# Patient Record
Sex: FEMALE | Race: WHITE | Employment: FULL TIME | URBAN - METROPOLITAN AREA
[De-identification: names, ages, dates, MRNs, and addresses within clinical notes are randomized per-mention and may not be internally consistent; named-entity substitution may affect disease eponyms.]

---

## 2020-03-20 ENCOUNTER — OFFICE VISIT (OUTPATIENT)
Dept: FAMILY MEDICINE | Facility: CLINIC | Age: 40
End: 2020-03-20
Payer: OTHER MISCELLANEOUS

## 2020-03-20 VITALS
DIASTOLIC BLOOD PRESSURE: 87 MMHG | SYSTOLIC BLOOD PRESSURE: 131 MMHG | WEIGHT: 237 LBS | TEMPERATURE: 97 F | HEART RATE: 85 BPM | OXYGEN SATURATION: 99 % | BODY MASS INDEX: 38.25 KG/M2 | RESPIRATION RATE: 18 BRPM

## 2020-03-20 DIAGNOSIS — G56.20 ULNAR NERVE ENTRAPMENT, UNSPECIFIED LATERALITY: ICD-10-CM

## 2020-03-20 DIAGNOSIS — G56.03 BILATERAL CARPAL TUNNEL SYNDROME: Primary | ICD-10-CM

## 2020-03-20 PROCEDURE — 99203 OFFICE O/P NEW LOW 30 MIN: CPT | Performed by: PHYSICIAN ASSISTANT

## 2020-03-20 RX ORDER — FLUTICASONE PROPIONATE 110 UG/1
2 AEROSOL, METERED RESPIRATORY (INHALATION) 2 TIMES DAILY
COMMUNITY
Start: 2019-03-29

## 2020-03-20 RX ORDER — FLUTICASONE PROPIONATE 50 MCG
2 SPRAY, SUSPENSION (ML) NASAL
COMMUNITY
Start: 2018-04-11

## 2020-03-20 RX ORDER — PSEUDOEPHEDRINE HCL 30 MG
TABLET ORAL EVERY 4 HOURS PRN
COMMUNITY

## 2020-03-20 RX ORDER — DICLOFENAC SODIUM 75 MG/1
75 TABLET, DELAYED RELEASE ORAL 2 TIMES DAILY
Qty: 30 TABLET | Refills: 1 | Status: SHIPPED | OUTPATIENT
Start: 2020-03-20 | End: 2020-03-27

## 2020-03-20 RX ORDER — ALBUTEROL SULFATE 90 UG/1
AEROSOL, METERED RESPIRATORY (INHALATION)
COMMUNITY
Start: 2019-03-29

## 2020-03-20 RX ORDER — CETIRIZINE HYDROCHLORIDE 10 MG/1
10 TABLET ORAL DAILY
COMMUNITY

## 2020-03-20 NOTE — PATIENT INSTRUCTIONS
For the next 2 weeks wear your wrist braces 24/7 when able.   For the following 2 weeks wear the wrist braces at night time.

## 2020-03-20 NOTE — PROGRESS NOTES
Subjective     Gaviota Haque is a 39 year old female who presents to clinic today for the following health issues:    HPI   Musculoskeletal problem/pain      Duration: x20 days    Description  Location: both arms from elbows to finger tips    Intensity:  moderate    Accompanying signs and symptoms: numbness and tingling    History  Previous similar problem: no   Previous evaluation:  none    Precipitating or alleviating factors:  Trauma or overuse: YES- trauma and overuse  Aggravating factors include: during sleep and bending of wrist     Therapies tried and outcome: rest/inactivity and support wrap    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    Past Surgical History:   Procedure Laterality Date     deviated septum       GALLBLADDER SURGERY  10/2011       Social History     Tobacco Use     Smoking status: Never Smoker     Smokeless tobacco: Never Used     Tobacco comment: Lives in smoke free household   Substance Use Topics     Alcohol use: Yes     Comment: occ     Family History   Problem Relation Age of Onset     Cancer No family hx of      Diabetes No family hx of      Glaucoma No family hx of      Macular Degeneration No family hx of      Hypertension Father      Thyroid Disease Mother      Cerebrovascular Disease Paternal Grandfather            Reviewed and updated as needed this visit by Provider         Review of Systems   ROS COMP: Constitutional, musculoskeletal, neuro, skin systems are negative, except as otherwise noted.      Objective    There were no vitals taken for this visit.  There is no height or weight on file  to calculate BMI.  Physical Exam   GENERAL: healthy, alert and no distress  MS: no gross musculoskeletal defects noted, no edema  MS: RUE and LUE exam shows normal strength and muscle mass, no deformities, no evidence of joint effusion and ROM of all joints is normal. Negative Tinel's at wrists, positive Tinel's at right elbow, positive Phalen's bilat  SKIN: no suspicious lesions or rashes  NEURO: Normal strength and tone, mentation intact and speech normal  PSYCH: mentation appears normal, affect normal/bright        Assessment & Plan   Assessment  1. Bilateral carpal tunnel syndrome    2. Ulnar nerve entrapment, unspecified laterality         Plan  1,2) Diclofenac BID x7 days. Continue wearing her wrist braces. 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       Patient Instructions   For the next 2 weeks wear your wrist braces 24/7 when able.   For the following 2 weeks wear the wrist braces at night time.       Return in about 2 months (around 5/20/2020) for wrist/elbow follow up.    Ann Rodriguez PA-C  [facility]

## 2020-03-20 NOTE — LETTER
[facility]  [address] [facility] [address]  Phone: [phone]    March 20, 2020        Gaviota Haque  [address] [facility] [address]          To whom it may concern:    RE: Gaviota Haque    Patient was seen and treated today at our clinic. Due to the symptoms we discussed I recommend she abstain from compounding for the next 2 months. We plan to reassess in 2 months (end of May).     Please contact me for questions or concerns.      Sincerely,        Ann Rodriguez PA-C

## 2020-04-03 ENCOUNTER — OFFICE VISIT (OUTPATIENT)
Dept: URGENT CARE | Facility: URGENT CARE | Age: 40
End: 2020-04-03

## 2020-04-03 ENCOUNTER — RESULTS ONLY (OUTPATIENT)
Dept: LAB | Age: 40
End: 2020-04-03

## 2020-04-03 DIAGNOSIS — Z20.822 SUSPECTED COVID-19 VIRUS INFECTION: Primary | ICD-10-CM

## 2020-04-03 PROCEDURE — 99207 ZZC NO BILLABLE SERVICE THIS VISIT: CPT | Performed by: NURSE PRACTITIONER

## 2020-04-03 NOTE — PATIENT INSTRUCTIONS
[facility] Employee Health team will receive your Covid 19 test results in the next 1-4 days.  Once your results are received, Employee Health will call you with your test result and discuss your Return to Work timeline and criteria.

## 2020-04-04 LAB
SARS-COV-2 PCR COMMENT: NORMAL
SARS-COV-2 RNA SPEC QL NAA+PROBE: NORMAL
SARS-COV-2 RNA SPEC QL NAA+PROBE: NORMAL
SPECIMEN SOURCE: NORMAL
SPECIMEN SOURCE: NORMAL

## 2020-04-09 ENCOUNTER — NURSE TRIAGE (OUTPATIENT)
Dept: NURSING | Facility: CLINIC | Age: 40
End: 2020-04-09

## 2020-04-09 NOTE — TELEPHONE ENCOUNTER
Patient calling for results, gave per epic. Patient still experiencing , SOB, sore throat, fatigue and dry cough. Advised to still stay home and self quarantine for 14 days. If your sx worsen  Advised to call back as needed.  Sharda Soler RN [facility]  COVID 19 Nurse Triage Plan/Patient Instructions    Please be aware that novel coronavirus (COVID-19) may be circulating in the community. If you develop symptoms such as fever, cough, or SOB or if you have concerns about the presence of another infection including coronavirus (COVID-19), please contact your health care provider or visit [email or web address].     Disposition/Instructions    Patient to stay at home and follow home care protocol based instructions.     Thank you for limiting contact with others, wearing a simple mask to cover your cough, practice good hand hygiene habits and accessing our virtual services where possible to limit the spread of this virus.    For more information about COVID19 and options for caring for yourself at home, please visit the CDC website at https://www.cdc.gov/coronavirus/2019-ncov/about/steps-when-sick.html  For more options for care at [facility], please visit our website at [email or web address]    For more information, please use the [place] Department of Health ([facility]) COVID-19 Hotlines (Interpreters available):     Health questions: Phone Number: [phone] or [phone] and Hours: 7 a.m. to 7 p.m.    Schools and  questions: Phone Number: [phone] or [phone] and Hours 7 a.m. to 7 p.m.

## 2020-04-10 ENCOUNTER — VIRTUAL VISIT (OUTPATIENT)
Dept: FAMILY MEDICINE | Facility: OTHER | Age: 40
End: 2020-04-10

## 2020-04-10 NOTE — PROGRESS NOTES
"Date: 04/10/2020 11:10:12  Clinician: Tito Vieira  Clinician NPI: [id number]  Patient: Gaviota Haque  Patient : 1980  Patient Address: [address] [place] [address]  Patient Phone: [phone]  Visit Protocol: URI  Patient Summary:  Gaviota is a 39 year old ( : 1980 ) female who initiated a Visit for cold, sinus infection, or influenza. When asked the question \"Please sign me up to receive news, health information and promotions from [employer].\", Gaviota responded \"No\".    Gaviota states her symptoms started suddenly 7-9 days ago. After her symptoms started, they improved and then got worse again.   Her symptoms consist of facial pain or pressure, myalgia, a sore throat, a cough, nasal congestion, malaise, ear pain, nausea, and a headache. She is experiencing mild difficulty breathing with activities but can speak normally in full sentences. Gaviota also feels feverish.   Symptom details     Nasal secretions: The color of her mucus is yellow.    Cough: Gaviota coughs a few times an hour and her cough is not more bothersome at night. Phlegm does not come into her throat when she coughs. She does not believe her cough is caused by post-nasal drip.     Sore throat: Gavitoa reports having moderate throat pain (4-6 on a 10 point pain scale), does not have exudate on her tonsils, and can swallow liquids. The lymph nodes in her neck are not enlarged. A rash has not appeared on the skin since the sore throat started.     Temperature: Her current temperature is 98 degrees Fahrenheit.     Facial pain or pressure: The facial pain or pressure does not feel worse when bending or leaning forward.     Headache: She states the headache is severe (7-9 on a 10 point pain scale).      Gaviota denies having rhinitis, enlarged lymph nodes, chills, diarrhea, vomiting, teeth pain, and wheezing. She also denies taking antibiotic medication for the symptoms, having recent facial or sinus surgery in the past " 60 days, and having a sinus infection within the past year.   Precipitating events  Within the past week, Gaviota has not been exposed to someone with strep throat. She has not recently been exposed to someone with influenza. Gaviota has been in close contact with the following high risk individuals: children under the age of 5.   Pertinent COVID-19 (Coronavirus) information  Gaviota has not traveled internationally or to the areas where COVID-19 (Coronavirus) is widespread, including cruise ship travel in the last 14 days before the start of her symptoms.   Gaviota has not had a close contact with a laboratory-confirmed COVID-19 patient within 14 days of symptom onset. She also has not had a close contact with a suspected COVID-19 patient within 14 days of symptom onset.   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 She does not live with a healthcare worker.   Pertinent medical history  Gaviota does not get yeast infections when she takes antibiotics.   Gaviota needs a return to work/school note.   Weight: 240 lbs   Gaviota does not smoke or use smokeless tobacco.   She denies pregnancy and denies breastfeeding. She has menstruated in the past month.   Additional information as reported by the patient (free text): Dizziness and over tired   Weight: 240 lbs    MEDICATIONS: Sudafed 12 Hour oral, Flonase Allergy Relief nasal, Zyrtec oral, albuterol sulfate inhalation, Flovent HFA inhalation, ALLERGIES: Singulair  Clinician Response:  Dear Gaviota,   Concern I for a secondary bacterial sinusitis v strep throat v ear infection. I have sent in a prescription for augmentin to be taken twice a day for 10 days. This will cover for a bacterial sinusitis, strep throat, ear infection, and pneumonia.&nbsp;   Additional Symptomatic Treatment Recommendations:   Continue with albuterol as needed for  cough, wheeze, or shortness of breath  Continue with Flovent as prescribed   Continue with flonase  Continue with sudafed   Push fluids  Get Plenty of rest  Tylenol to help with pain or fever  Salt water gargles or lozenges to help with sore throat  Humidified air can help with congestion  Can do a cool compress to the forehead or back of neck to help with headache     Based on the information you have provided, you do have symptoms that are consistent with Coronavirus (COVID-19).  The coronavirus causes mild to severe respiratory illness with the most common symptoms including fever, cough and difficulty breathing. Unfortunately, many viruses cause similar symptoms and it can be difficult to distinguish between viruses, especially in mild cases, so we are presuming that anyone with cough or fever has coronavirus at this time.  Coronavirus/COVID-19 has reached the point of community spread in [place], meaning that we are finding the virus in people with no known exposure risk for memo the virus. Given the increasing commonness of coronavirus in the community we are no longer testing patients who are not critically ill.  If you are a health care worker, you should refer to your employee health office for instructions about testing and returning to work.  For everyone else who has cough or fever, you should assume you are infected with coronavirus. Since you will not be tested but have symptoms that may be consistent with coronavirus, the CDC recommends you stay in self-isolation until these three things have happened:    You have had no fever for at least 72 hours (that is three full days of no fever without the use of medicine that reduces fevers)    AND   Other symptoms have improved (for example, when your cough or shortness of breath have improved)   AND   At least 7 days have passed since your symptoms first appeared.   How to Isolate:   Isolate yourself at home.  Do Not allow any visitors  Do Not go to  work or school  Do Not go to [employer],  centers, shopping, or other public places.  Do Not shake hands.  Avoid close contact with others (hugging, kissing).   Protect Others:   Cover Your Mouth and Nose with a mask, disposable tissue or wash cloth to avoid spreading germs to others.  Wash your hands and face frequently with soap and water.   We know it can be scary to hear that you might have COVID-19. Our team can help track your symptoms and make sure you are doing ok over the next two weeks using a program called [employer] to keep in touch. When you receive an email from [employer], please consider enrolling in our monitoring program. There is no cost to you for monitoring. Here is a URL where you can learn more: [email or web address]  Managing Symptoms:   At this time, we primarily recommend Tylenol (Acetaminophen) for fever or pain. If you have liver or kidney problems, contact your primary care provider for instructions on use of tylenol. Adults can take 650 mg (two 325 mg pills) by mouth every 4-6 hours as needed OR 1,000 mg (two 500 mg pills) every 8 hours as needed. MAXIMUM DAILY DOSE: 3,000mg. For children, refer to dosing on bottle based on age or weight.   If you develop significant shortness of breath that prevents you from doing normal activities, please call 911 or proceed to the nearest emergency room and alert them immediately that you have been in self-isolation for possible coronavirus.  If you have a higher risk medical condition such as cancer, heart failure, end stage renal disease on dialysis or have a transplant, please reach out to your specialist's clinic to advise them of your [facility] visit should you not improve within the next two days.   For more information about COVID19 and options for caring for yourself at home, please visit the CDC website at https://www.cdc.gov/coronavirus/2019-ncov/about/steps-when-sick.htmlFor more options for care at [facility],  please visit our website at [email or web address]       Diagnosis: Cough  Diagnosis ICD: R05  Prescription: amoxicillin-pot clavulanate (Augmentin) 875-125 mg oral tablet 20 tablet, 10 days supply. Take 1 tablet by mouth every 12 hours for 10 days. Refills: 0, Refill as needed: no, Allow substitutions: yes  Pharmacy: [facility] - [phone] - [address] [place] [address]

## 2020-06-04 NOTE — PROGRESS NOTES
Subjective     Gaviota Haque is a 39 year old female who presents to clinic today for the following health issues:    HPI   Following up on: Bilateral Carpal Tunnel Syndrome    Last visit this was discussed: 03/20/20 with Ann Rodriguez    Progression of Symptoms:  Were getting better until went fishing over Memorial Day weekend    Accompanying Signs & Symptoms: numbness and pain    Taking Medication as prescribed: was given diclofenac for 7 days    Side Effects:  None    Medication Helping Symptoms:  Brace helps more than meds    Overuse. Works compounding chemo.   Hand and forearm numbness. Worse at noc.   Some mild proximal and ventral hand pain.   Rest and bracing helps. Can't where braces while at work.   No neck pain. No profound weakness.     There is no problem list on file for this patient.    Past Surgical History:   Procedure Laterality Date     deviated septum       GALLBLADDER SURGERY  10/2011       Social History     Tobacco Use     Smoking status: Never Smoker     Smokeless tobacco: Never Used     Tobacco comment: Lives in smoke free household   Substance Use Topics     Alcohol use: Yes     Comment: occ     Family History   Problem Relation Age of Onset     Hypertension Father      Thyroid Disease Mother      Cerebrovascular Disease Paternal Grandfather      Cancer No family hx of      Diabetes No family hx of      Glaucoma No family hx of      Macular Degeneration No family hx of          Current Outpatient Medications   Medication Sig Dispense Refill     albuterol (PROAIR HFA/PROVENTIL HFA/VENTOLIN HFA) 108 (90 Base) MCG/ACT inhaler INHALE 2 PUFFS BY MOUTH 4 TIMES DAILY IF NEEDED.       cetirizine (ZYRTEC ALLERGY) 10 MG tablet Take 10 mg by mouth daily       fluticasone (FLONASE) 50 MCG/ACT nasal spray Spray 2 sprays in nostril       fluticasone (FLOVENT HFA) 110 MCG/ACT inhaler Inhale 2 puffs into the lungs 2 times daily       pseudoePHEDrine (SUDAFED) 30 MG tablet Take by mouth every 4 hours as  needed for congestion       diclofenac (VOLTAREN) 75 MG EC tablet Take 1 tablet (75 mg) by mouth 2 times daily for 7 days - may use as needed after 1 week 30 tablet 1     PRENATAL VITAMINS PO Take  by mouth.       Allergies   Allergen Reactions     Montelukast Hives     No lab results found.   BP Readings from Last 3 Encounters:   06/05/20 128/86   03/20/20 131/87   05/31/12 123/73    Wt Readings from Last 3 Encounters:   06/05/20 106.9 kg (235 lb 9.6 oz)   03/20/20 107.5 kg (237 lb)   05/23/12 92.5 kg (204 lb)                      Reviewed and updated as needed this visit by Provider         Review of Systems   Constitutional, HEENT, cardiovascular, pulmonary, GI, , musculoskeletal, neuro, skin, endocrine and psych systems are negative, except as otherwise noted.      Objective    /86   Pulse 94   Temp 98.7  F (37.1  C) (Oral)   Resp 16   Wt 106.9 kg (235 lb 9.6 oz)   LMP 05/26/2020 (Exact Date)   SpO2 98%   Breastfeeding No   BMI 38.03 kg/m    Body mass index is 38.03 kg/m .  Physical Exam   Neck : rom normal. Negative spurlings test.  WRISTS: Exam reveals positive Phalen and Tinel sign on bilateral. There is no muscle atrophy noted. Strength and sensation are normal.    Gaviota was seen today for recheck.    Diagnoses and all orders for this visit:    Bilateral carpal tunnel syndrome  -     EMG; Future  -     predniSONE (DELTASONE) 20 MG tablet; Take 1 tablet (20 mg) by mouth 2 times daily for 7 days  -     [facility] REFERRAL; Future      Advised supportive and symptomatic treatment.  Follow up with Provider - if condition persists or worsens.   Recheck in 6 wks.

## 2020-06-05 ENCOUNTER — OFFICE VISIT (OUTPATIENT)
Dept: FAMILY MEDICINE | Facility: CLINIC | Age: 40
End: 2020-06-05
Payer: OTHER MISCELLANEOUS

## 2020-06-05 VITALS
WEIGHT: 235.6 LBS | DIASTOLIC BLOOD PRESSURE: 86 MMHG | HEART RATE: 94 BPM | SYSTOLIC BLOOD PRESSURE: 128 MMHG | RESPIRATION RATE: 16 BRPM | TEMPERATURE: 98.7 F | BODY MASS INDEX: 38.03 KG/M2 | OXYGEN SATURATION: 98 %

## 2020-06-05 DIAGNOSIS — G56.03 BILATERAL CARPAL TUNNEL SYNDROME: Primary | ICD-10-CM

## 2020-06-05 PROCEDURE — 99213 OFFICE O/P EST LOW 20 MIN: CPT | Performed by: PHYSICIAN ASSISTANT

## 2020-06-05 RX ORDER — PREDNISONE 20 MG/1
20 TABLET ORAL 2 TIMES DAILY
Qty: 14 TABLET | Refills: 0 | Status: SHIPPED | OUTPATIENT
Start: 2020-06-05 | End: 2020-07-24

## 2020-06-17 ENCOUNTER — TELEPHONE (OUTPATIENT)
Dept: FAMILY MEDICINE | Facility: CLINIC | Age: 40
End: 2020-06-17

## 2020-06-17 NOTE — TELEPHONE ENCOUNTER
Patient would like the referral for a EMG sent to [facility] in [place].  Please call when sent so the patient can make an appointment.  Thank you  Caller informed that calls received after 3pm may not be returned same day.

## 2020-06-18 NOTE — TELEPHONE ENCOUNTER
EMG order faxed to [facility] in [place]. Fax # [phone]. Left message on patient's voicemail order faxed.

## 2020-06-25 ENCOUNTER — TRANSFERRED RECORDS (OUTPATIENT)
Dept: HEALTH INFORMATION MANAGEMENT | Facility: CLINIC | Age: 40
End: 2020-06-25

## 2020-07-06 ENCOUNTER — TELEPHONE (OUTPATIENT)
Dept: FAMILY MEDICINE | Facility: CLINIC | Age: 40
End: 2020-07-06

## 2020-07-06 NOTE — TELEPHONE ENCOUNTER
Patient would like a call regarding her results from a Neurology visit last week. States they should have been sent to PCP. Deirdre Vasquez TC/Pt Rep

## 2020-07-07 NOTE — TELEPHONE ENCOUNTER
FYI only-patient would like to discuss a f/u plan, appt scheduled for 07/24/20 with Ann SUTHERLAND She has also chosen Ann as her primary PCP.

## 2020-07-07 NOTE — TELEPHONE ENCOUNTER
EMG results to Mauricio in basket for review in absence of Yaya Birch. Patient was seen by EK on 03/20/20 for this concern.

## 2020-07-22 NOTE — PROGRESS NOTES
"Subjective     Gaviota Haque is a 39 year old female who presents to clinic today for the following health issues:    HPI   Bilateral Carpal Tunnel Syndrome Recheck      Last visit this was discussed: 06/05/20 with Yaya Birch    Progression of Symptoms:  Aggravated memorial day weekend while fishing     Accompanying Signs & Symptoms: numbness and pain    Taking Medication as prescribed: Given diclofenac for 7 days on 03/20/2020 and given 7 days course of prednisone 20mg to take BID on 06/05/2020    Side Effects:  None    Medication Helping Symptoms:  Brace helps more than meds and rest     Referral placed for PT and EMG was completed on 06/25/20. Has not scheduled PT yet, as she has limited time off.    Overuse. 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 Worse at noc.   Some mild proximal and ventral hand pain.   Rest and bracing helps.   No neck pain. No profound weakness.     Since she stopped compounding: significantly better, 70-75% better  Becomes aggravated with certain things: cooking, writing/coloring   Wearing braces at night which really helps, doesn't wake up with \"numb arms\"  Has dropped things -  noticed this the last couple things  Can't open jars, causes pain     Ally DeShong CMA        Patient Active Problem List   Diagnosis     Bilateral carpal tunnel syndrome     Past Surgical History:   Procedure Laterality Date     deviated septum       GALLBLADDER SURGERY  10/2011       Social History     Tobacco Use     Smoking status: Never Smoker     Smokeless tobacco: Never Used     Tobacco comment: Lives in smoke free household   Substance Use Topics     Alcohol use: Yes     Comment: occ     Family History   Problem Relation Age of Onset     Hypertension Father      Thyroid Disease Mother      Cerebrovascular Disease Paternal Grandfather      Cancer No family hx of      Diabetes No family hx of      Glaucoma No family hx of      Macular " Degeneration No family hx of            Reviewed and updated as needed this visit by Provider         Review of Systems   Constitutional, musculoskeletal, neuro systems are negative, except as otherwise noted.      Objective    /81   Pulse 76   Temp 98.6  F (37  C) (Tympanic)   Resp 15   Wt 107 kg (236 lb)   SpO2 97%   Breastfeeding No   BMI 38.09 kg/m    Body mass index is 38.09 kg/m .  Physical Exam   GENERAL: healthy, alert and no distress  MS: no gross musculoskeletal defects noted, no edema  MS: positive Tinel's at elbow on left  SKIN: no suspicious lesions or rashes  NEURO: Normal strength and tone, mentation intact and speech normal  PSYCH: mentation appears normal, affect normal/bright        Assessment & Plan   Assessment  1. Bilateral carpal tunnel syndrome         Plan  EMG reviewed with patient. She will continue wearing her braces at night. She will start hand therapy and consult orthopedics. 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      Return in about 3 months (around 10/24/2020) for CTS follow up.    Ann Rodriguez PA-C  [facility]

## 2020-07-24 ENCOUNTER — OFFICE VISIT (OUTPATIENT)
Dept: FAMILY MEDICINE | Facility: CLINIC | Age: 40
End: 2020-07-24
Payer: OTHER MISCELLANEOUS

## 2020-07-24 VITALS
RESPIRATION RATE: 15 BRPM | WEIGHT: 236 LBS | BODY MASS INDEX: 38.09 KG/M2 | OXYGEN SATURATION: 97 % | TEMPERATURE: 98.6 F | DIASTOLIC BLOOD PRESSURE: 81 MMHG | SYSTOLIC BLOOD PRESSURE: 120 MMHG | HEART RATE: 76 BPM

## 2020-07-24 DIAGNOSIS — G56.03 BILATERAL CARPAL TUNNEL SYNDROME: Primary | ICD-10-CM

## 2020-07-24 PROCEDURE — 99213 OFFICE O/P EST LOW 20 MIN: CPT | Performed by: PHYSICIAN ASSISTANT

## 2022-10-11 ENCOUNTER — E-VISIT (OUTPATIENT)
Dept: URGENT CARE | Facility: URGENT CARE | Age: 42
End: 2022-10-11
Payer: COMMERCIAL

## 2022-10-11 DIAGNOSIS — J01.90 ACUTE SINUSITIS WITH SYMPTOMS > 10 DAYS: Primary | ICD-10-CM

## 2022-10-11 PROCEDURE — 99421 OL DIG E/M SVC 5-10 MIN: CPT | Performed by: PHYSICIAN ASSISTANT

## 2022-10-11 NOTE — PATIENT INSTRUCTIONS
Dear Gaviota Haque    I have diagnosed you with Sinusitis and a secondary cough with underlying allergies.  Continue your flonase and daily antihistamine.  I have sent over Augmentin for treatment.    Follow up with your primary clinic should symptoms persist or worsen.

## 2023-01-08 ENCOUNTER — HEALTH MAINTENANCE LETTER (OUTPATIENT)
Age: 43
End: 2023-01-08

## 2024-02-10 ENCOUNTER — HEALTH MAINTENANCE LETTER (OUTPATIENT)
Age: 44
End: 2024-02-10

## 2025-03-08 ENCOUNTER — HEALTH MAINTENANCE LETTER (OUTPATIENT)
Age: 45
End: 2025-03-08